# Patient Record
Sex: MALE | Race: WHITE | NOT HISPANIC OR LATINO | Employment: UNEMPLOYED | URBAN - METROPOLITAN AREA
[De-identification: names, ages, dates, MRNs, and addresses within clinical notes are randomized per-mention and may not be internally consistent; named-entity substitution may affect disease eponyms.]

---

## 2017-01-23 ENCOUNTER — OFFICE VISIT (OUTPATIENT)
Dept: INTERNAL MEDICINE | Facility: CLINIC | Age: 22
End: 2017-01-23

## 2017-01-23 VITALS
SYSTOLIC BLOOD PRESSURE: 116 MMHG | BODY MASS INDEX: 24.52 KG/M2 | HEIGHT: 73 IN | DIASTOLIC BLOOD PRESSURE: 84 MMHG | WEIGHT: 185 LBS

## 2017-01-23 DIAGNOSIS — Z00.00 HEALTH CARE MAINTENANCE: Primary | ICD-10-CM

## 2017-01-23 DIAGNOSIS — F80.81 STUTTERING: ICD-10-CM

## 2017-01-23 PROCEDURE — 99395 PREV VISIT EST AGE 18-39: CPT | Performed by: PHYSICIAN ASSISTANT

## 2017-01-23 PROCEDURE — 90686 IIV4 VACC NO PRSV 0.5 ML IM: CPT | Performed by: PHYSICIAN ASSISTANT

## 2017-01-23 NOTE — PROGRESS NOTES
"Chief Complaint   Patient presents with   • Establish Care       Subjective   Newton Franklin is a 21 y.o. male.       History of Present Illness     Pt is here to establish care. He has not had PCP for years, is healthy overall.    The patient is being seen for a health maintenance evaluation.  The last health maintenance was unknown year(s) ago.    Social History  Newton  does not smoke cigarettes.   He drinks no alcohol.  He does not use illicit drugs.    General History  Newton  does have regular dental visits.  He does not complain of vision problems. Last eye exam was 2015.  Immunizations are not up to date. The patient needs the following immunizations: needs influenza    Lifestyle  Newton  consumes a in general, a \"healthy\" diet  .  He exercises daily.    Reproductive Health  Newton  is sexually active. His contraceptive plan is oral contraceptives (estrogen/progesterone).   He does not have erectile dysfunction.     Screening  Last PSA was never.  Last prostate exam was  never.  Last testicular exam was never.  Last colonoscopy was never.    Renato has stuttered for most of his life. Did speech therapy until 3rd grade. Interested in trying some therapy again. Feels like it is affecting his ability to get a job.              No current outpatient prescriptions on file.     [facility]  The following portions of the patient's history were reviewed and updated as appropriate: allergies, current medications, past family history, past medical history, past social history, past surgical history and problem list.    Review of Systems   Constitutional: Negative for appetite change, fever and unexpected weight change.   HENT: Negative for ear pain, facial swelling and sore throat.    Eyes: Negative for pain and visual disturbance.   Respiratory: Negative for chest tightness, shortness of breath and wheezing.    Cardiovascular: Negative for chest pain and palpitations.   Gastrointestinal: Negative for abdominal pain and blood in stool. " "  Endocrine: Negative.    Genitourinary: Negative for difficulty urinating and hematuria.   Musculoskeletal: Negative for joint swelling.   Neurological: Negative for tremors, seizures and syncope.   Hematological: Negative for adenopathy.   Psychiatric/Behavioral: Negative.        Objective   Visit Vitals   • /84   • Ht 73\" (185.4 cm)   • Wt 185 lb (83.9 kg)   • BMI 24.41 kg/m2       Physical Exam   Constitutional: He is oriented to person, place, and time. He appears well-developed and well-nourished. No distress.   HENT:   Head: Normocephalic and atraumatic. Hair is normal.   Right Ear: Hearing, tympanic membrane, external ear and ear canal normal.   Left Ear: Hearing, tympanic membrane, external ear and ear canal normal.   Nose: No sinus tenderness or nasal deformity.   Mouth/Throat: Uvula is midline, oropharynx is clear and moist and mucous membranes are normal. No oral lesions. No uvula swelling.   Eyes: Conjunctivae, EOM and lids are normal. Pupils are equal, round, and reactive to light. Right eye exhibits no discharge. Left eye exhibits no discharge. No scleral icterus. Right eye exhibits normal extraocular motion and no nystagmus. Left eye exhibits normal extraocular motion and no nystagmus.   Fundoscopic exam:       The right eye shows red reflex.        The left eye shows red reflex.   Neck: Normal range of motion. Neck supple. No JVD present. No tracheal deviation present. No thyromegaly present.   Cardiovascular: Normal rate, regular rhythm, normal heart sounds, intact distal pulses and normal pulses.  Exam reveals no gallop.    No murmur heard.  Pulmonary/Chest: Effort normal and breath sounds normal. No respiratory distress. He has no wheezes. He has no rales. He exhibits no tenderness.   Abdominal: Soft. Bowel sounds are normal. He exhibits no distension and no mass. There is no tenderness. There is no guarding. No hernia.   Genitourinary: Testes normal and penis normal.   Musculoskeletal: " Normal range of motion. He exhibits no edema, tenderness or deformity.   Lymphadenopathy:     He has no cervical adenopathy.   Neurological: He is alert and oriented to person, place, and time. He has normal reflexes. He displays normal reflexes. No cranial nerve deficit. He exhibits normal muscle tone. Coordination normal.   Skin: Skin is warm and dry. No rash noted. He is not diaphoretic.   Psychiatric: He has a normal mood and affect. His behavior is normal. Judgment and thought content normal.   Nursing note and vitals reviewed.      No results found for this or any previous visit.     ASSESSMENT/PLAN    Problem List Items Addressed This Visit        Other    Stuttering    Relevant Orders    Ambulatory Referral to Speech Therapy    Health care maintenance - Primary     Immunizations: influenza vaccine given today  Eye exam: due, pt will schedule  Labs: pt will return when fasting for baseline screening labs           Relevant Orders    Lipid Panel    Comprehensive Metabolic Panel    CBC (No Diff)    TSH    Vitamin D 25 Hydroxy               Return in about 1 year (around 1/23/2018) for Annual physical.

## 2017-01-23 NOTE — MR AVS SNAPSHOT
"Newton Franklin   1/23/2017 1:00 PM   Office Visit    Dept Phone:  [phone]   Encounter #:  [id number]    Provider:  GUY Gonzalez   Department:  [facility]                Your Full Care Plan              Your Updated Medication List      Notice  As of 1/23/2017  1:50 PM    You have not been prescribed any medications.            We Performed the Following     CBC (No Diff)     Comprehensive Metabolic Panel     Lipid Panel     TSH     Vitamin D 25 Hydroxy       You Were Diagnosed With        Codes Comments    Health care maintenance    -  Primary ICD-10-CM: Z00.00  ICD-9-CM: V70.0       Instructions     None    Patient Instructions History      Upcoming Appointments     Visit Type Date Time Department    NEW PATIENT 1/23/2017  1:00 PM [facility]      MyChart Signup     Our records indicate that you have declined [facility] sign[facility]. If you would like to sign up for [facility], please email [email or web address] or call [phone] to obtain an activation code.             Other Info from Your Visit           Allergies     No Known Allergies      Reason for Visit     Establish Care           Vital Signs     Blood Pressure Height Weight Body Mass Index Smoking Status       116/84 (20 %/ 71 %)* 73\" (185.4 cm) (89 %, Z= 1.23)† 185 lb (83.9 kg) (86 %, Z= 1.07)† 24.41 kg/m2 (70 %, Z= 0.53)† Never Smoker     *BP percentiles are based on NHBPEP's 4th Report    †Growth percentiles are based on CDC 2-20 Years data.      Problems and Diagnoses Noted     Health maintenance examination    -  Primary        "

## 2017-01-24 NOTE — ASSESSMENT & PLAN NOTE
Immunizations: influenza vaccine given today  Eye exam: due, pt will schedule  Labs: pt will return when fasting for baseline screening labs

## 2017-01-26 ENCOUNTER — TELEPHONE (OUTPATIENT)
Dept: INTERNAL MEDICINE | Facility: CLINIC | Age: 22
End: 2017-01-26

## 2017-01-26 DIAGNOSIS — B80 PINWORMS: Primary | ICD-10-CM

## 2017-01-26 NOTE — TELEPHONE ENCOUNTER
----- Message from Amy Roberson MA sent at 1/26/2017 10:41 AM EST -----  Contact: PATIENT'S GRANDMOTHER      ----- Message -----     From: Nesha Mcdonough     Sent: 1/26/2017  10:35 AM       To: [facility]    RE: PINWORMS    MR BOLIVAR'S GRANDMOTHER CALLED TO SEE IF EMERALD WOULD GIVE HIM A TEST KIT TO CHECK FOR PINWORMS. HE HAS HAD THEM BEFORE AND WOULD LIKE TO KNOW IF HE DOES AGAIN OR NOT AND A FAMILY FRIEND SUGGESTED CALLING HIS PCP TO ASK FOR A TEST KIT. HIS GRANDMOTHER ALSO STATES THAT MR BOLIVAR'S GIRLFRIEND CURRENTLY HAS PINWORMS. IS THIS POSSIBLE OR WOULD HE NEED TO MAKE AN APPT WITH EMERALD?    CALL BACK #[phone]

## 2017-01-26 NOTE — TELEPHONE ENCOUNTER
I contacted the lab at [facility] and they are going to send over some clear paddles that can be used for collecting a sample to be tested for pinworms. I will have the lab call pt when they are available for .

## 2017-01-27 ENCOUNTER — HOSPITAL ENCOUNTER (OUTPATIENT)
Dept: SPEECH THERAPY | Facility: HOSPITAL | Age: 22
Setting detail: THERAPIES SERIES
Discharge: HOME | End: 2017-01-27

## 2017-01-27 DIAGNOSIS — F80.81 STUTTERING: Primary | ICD-10-CM

## 2017-01-27 PROCEDURE — G9163 LANG EXPRESS GOAL STATUS: HCPCS

## 2017-01-27 PROCEDURE — G9162 LANG EXPRESS CURRENT STATUS: HCPCS

## 2017-01-27 PROCEDURE — 92523 SPEECH SOUND LANG COMPREHEN: CPT

## 2017-01-27 NOTE — PROGRESS NOTES
"Outpatient Speech Language Pathology   Adult/Peds Fluency Initial Evaluation  [facility]     Patient Name: Newton Franklin  : 1995  MRN: [id number]  Today's Date: 2017         Visit Date: 2017   Patient Active Problem List   Diagnosis   • Stuttering   • Health care maintenance        No past medical history on file.     Past Surgical History   Procedure Laterality Date   • Tonsillectomy           Visit Dx:    ICD-10-CM ICD-9-CM   1. Stuttering F80.81 315.35                 Fluency Eval - 17 1300     Background/History    Reason for Referral Pt is a 21 year old male with a hx of stuttering since \"birth\", definitely recalls starting to stutter at 6 years old.  -HG    History of Current Complaint Stuttering   -HG    Date of Onset When he was a child.  -HG    Changes Since Date of Onset unspecified  -HG    Previous Evaluations Per pt, he did receive instruction in 2nd grade.   -HG    Effective Fluency Enhancing Strategies slow/easy speech;light articulatory contacts  -HG    Pertinent Family History --   No stuttering as far as pt knows.  -HG    Caregiver Interview    Situations in Which Stuttering is Observed public speaking;telephone conversations;direct questioning;communication with authority figures, communication with same age peers   Pt states that it is un-predictable.  -HG    Types of Dysfluencies part word repetitions;prolongations  -HG    Accompanying Behaviors other   Looking upward.  -HG    Formal Assessment    Formal Assessments and Rating Scales Trigger other1   WPM= 187   an average over three samples.  -HG    Informal Assessment    Observation of Fluency Sample activity  -HG    Type of Activity Observed Relax, calm, monotone, closed mouth.  -HG    Concomitant Behaviors Upwardd looking.  -HG    Observed Phonation Characteristics too soft  -HG    Observed Prosodic Characteristics atypical stress  -    Articulation/Phonology Screening speech is intelligible  -    Language " "Screening language appears age-appropriate  -    Findings    Clinical Impression- Fluency no secondary characteristics are observed  -HG    Impact on Functional Communication- Fluency unable to communicate wants/needs/ideas functionally in ADLs;difficulty communicating in social situations;difficulty communicating via telephone;difficulty with public speaking;difficulty speaking to authority figures;difficulty responding to direct questions  -    Prognosis Mild disfluent communication disorder  -HG    Prognosis Factors Good  -HG      User Key  (r) = Recorded By, (t) = Taken By, (c) = Cosigned By    Initials Name Provider Type     Lakesha Lopez MS [facility]-SLP Speech and Language Pathologist              Adult Speech Language - 01/27/17 1300     Background and History    Reason for Referral Pt is a 21 year old who has hx of stuttering since birth and wishes to get a job and is seeking tx to \"stop stuttering\"  -    Stated Goals \"Stop Stuttering\"  -    Description of Complaint \"There are days I can speak and other days I can't\"  -    Previous Functional Status Speech was non-fluent  -HG    Current Baseline Abilities Pt is able to talk through the disfluencies and pt stated, \"I've learned no strategies, just taught myself over time.\"  -HG    Primary Language in the Home English  -    Informant for the Evaluation Self  -    Comprehension    Recognition WFL: Within Functional Limits  -    Answer Questions WFL: Within Functional Limits  -    Commands WFL: Within Functional Limits  -    Conversation WFL: Within Functional Limits  -    Reading Status WFL: Within Functional Limits  -    Efficiency of Verbal Communication    Verbal Messages are: --   at times.  -HG      User Key  (r) = Recorded By, (t) = Taken By, (c) = Cosigned By    Initials Name Provider Type     Lakesha Lopez MS [facility]-SLP Speech and Language Pathologist                             SLP Education       01/27/17 1300          " Education    Barriers to Learning No barriers identified  -      Education Provided Described results of evaluation;Patient expressed understanding of evaluation;Patient participated in establishing goals and treatment plan;Patient demonstrated recommended strategies;Patient requires further education on strategies, risks  -      Assessed Learning needs;Learning motivation;Learning preferences;Learning readiness  -      Learning Motivation Strong  -      Learning Method Explanation;Demonstration;Teach back  -      Teaching Response Verbalized understanding;Demonstrated understanding;Reinforcement needed  -        User Key  (r) = Recorded By, (t) = Taken By, (c) = Cosigned By    Initials Name Effective Dates     Lakesha Lopez MS [facility]-SLP 06/22/15 -                 SLP OP Goals       01/27/17 1400          Goal Type Needed    Goal Type Needed Other Adult Goals  -HG      Subjective Comments    Subjective Comments Pt alert, cooperative, appeared calm and confident yet passionate about finding a job and moving on with his life.   -HG      Subjective Pain    Able to rate subjective pain? yes  -HG      Other Goals    Other Adult Goal- 1 Pt will use easy onset in order to decrease repetitions at the beginning of words with 80% accuracy.  -HG      Status: Other Adult Goal- 1 New  -HG      Other Adult Goal- 2 Pt will use internal compensatory strategies in order to decrease occurrence of disfluencies in a 5 minute time span with 80% accuracy.  -HG      Status: Other Adult Goal- 2 New  -HG      Other Adult Goal- 3 Pt will slow rate of speech and over articulate at the word and phrase level with 90% accuracy.  -HG      Status: Other Adult Goal- 3 New  -HG      SLP Time Calculation    SLP Goal Re-Cert Due Date 02/26/17  -        User Key  (r) = Recorded By, (t) = Taken By, (c) = Cosigned By    Initials Name Provider Type     Lakesha Lopez MS [facility]-SLP Speech and Language Pathologist                OP SLP  Assessment/Plan - 01/27/17 1300     SLP Assessment    Functional Problems Fluency  -HG    Impact on Functional Communication- Fluency unable to communicate wants/needs/ideas functionally in ADLs;difficulty communicating in social situations;difficulty communicating via telephone;difficulty with public speaking;difficulty speaking to authority figures;difficulty responding to direct questions  -HG    Clinical Impression- Fluency no secondary characteristics are observed  -HG    Functional Problems Comment Due to pt's hx of stuttering and had never received formal tx for correction, pt is unable at this time to become hired for a job.  -HG    Clinical Impression Comments Pt presents with a mild disfluency impeding him from completing a successful job interview and acquiring employment.  -HG    Please refer to paper survey for additional self-reported information Yes  -HG    Please refer to items scanned into chart for additional diagnostic informaiton and handouts as provided by clinician Yes  -HG    SLP Diagnosis Mild disfluency   -HG    Prognosis Excellent (comment)  -HG    Patient/caregiver participated in establishment of treatment plan and goals Yes  -HG    Patient would benefit from skilled therapy intervention Yes  -HG    SLP Plan    Frequency 2-3x/week  -HG    Duration 4 weeks  -HG    Planned CPT's? SLP SPEECH & LANGUAGE EVAL: 93581;SLP INDIVIDUAL SPEECH THERAPY: 97138  -    Expected Duration Therapy Session (min) 45-60 minutes  -HG    Plan Comments Initiate fluency tx.  -HG      User Key  (r) = Recorded By, (t) = Taken By, (c) = Cosigned By    Initials Name Provider Type     Lakesha Lopez, MS CCC-SLP Speech and Language Pathologist                   Time Calculation:   SLP Start Time: 1300    Therapy Charges for Today     Code Description Service Date Service Provider Modifiers Qty    33236327786  ST EVAL SPEECH AND PROD W LANG  7 1/27/2017 Lakesha Lopez MS CCC-SLP GN 1                   Lakesha  NAKIA Lopez, MS CCC-SLP  1/27/2017

## 2017-02-28 ENCOUNTER — HOSPITAL ENCOUNTER (OUTPATIENT)
Dept: SPEECH THERAPY | Facility: HOSPITAL | Age: 22
Setting detail: THERAPIES SERIES
Discharge: HOME | End: 2017-02-28

## 2017-02-28 DIAGNOSIS — F80.81 STUTTERING: Primary | ICD-10-CM

## 2017-02-28 PROCEDURE — G9163 LANG EXPRESS GOAL STATUS: HCPCS

## 2017-02-28 PROCEDURE — G9162 LANG EXPRESS CURRENT STATUS: HCPCS

## 2017-02-28 PROCEDURE — 92507 TX SP LANG VOICE COMM INDIV: CPT

## 2017-03-07 ENCOUNTER — HOSPITAL ENCOUNTER (OUTPATIENT)
Dept: SPEECH THERAPY | Facility: HOSPITAL | Age: 22
Setting detail: THERAPIES SERIES
Discharge: HOME | End: 2017-03-07

## 2017-03-07 DIAGNOSIS — F80.81 STUTTERING: Primary | ICD-10-CM

## 2017-03-07 PROCEDURE — 92507 TX SP LANG VOICE COMM INDIV: CPT

## 2017-03-07 NOTE — PROGRESS NOTES
Outpatient Speech Language Pathology   Adult Speech Language Cognitive Treatment Note  [facility]     Patient Name: Newton Franklin  : 1995  MRN: [id number]  Today's Date: 3/7/2017         Visit Date: 2017   Patient Active Problem List   Diagnosis   • Stuttering   • Health care maintenance          Visit Dx:    ICD-10-CM ICD-9-CM   1. Stuttering F80.81 315.35                               SLP OP Goals       17 0800       Subjective Comments    Subjective Comments Pt alert, cooperative.  Pt did state that he has tried strategies and over articulating is the best strategy that has worked so far.   -HG     Other Goals    Other Adult Goal- 1 Pt will use easy onset in order to decrease repetitions at the beginning of words with 80% accuracy.  -HG     Status: Other Adult Goal- 1 Progressing as expected  -HG     Comments: Other Adult Goal- 1 Pt used strategy this date with 60% accuracy.  Pt completed a cold call to a local restaurant in order to find out a special and pt experienced a block with two attempts without strategy and with using a breathing technique, pt was able to complete his thought. 3/7/17: In conversation,pt struggled with I at the beginning of the sentence. And cold calls resulted in frequent repetitions and blocks where easy onset and over articulation was used and pt was 70-80% accurate in using them.    -HG     Other Adult Goal- 2 Pt will use internal compensatory strategies in order to decrease occurrence of disfluencies in a 5 minute time span with 80% accuracy.  -HG     Status: Other Adult Goal- 2 Progressing as expected  -HG     Comments: Other Adult Goal- 2 Pt is able to speak in prolongations up to 10 minutes without a disfluency.  3/7/17: in conversation, pt is 90% fluent with occasional repetitions.   -HG     Other Adult Goal- 3 Pt will slow rate of speech and over articulate at the word and phrase level with 90% accuracy.  -HG     Status: Other Adult Goal- 3 Progressing as  expected  -     Comments: Other Adult Goal- 3 Pt asked to repeat multisyllabic words and pt was 90% fluent. 3/7/17: Pt reports that over-articulating is the best strategy thus far for decreasing disfluencies.   -     SLP Time Calculation    SLP Goal Re-Cert Due Date 03/30/17  -       User Key  (r) = Recorded By, (t) = Taken By, (c) = Cosigned By    Initials Name Provider Type     Lakesha Lopez MS [facility]-SLP Speech and Language Pathologist                OP SLP Education       03/07/17 0800    Education    Barriers to Learning No barriers identified  -    Education Provided Patient demonstrated recommended strategies;Patient requires further education on strategies, risks  -    Assessed Learning needs;Learning motivation;Learning preferences;Learning readiness  -    Learning Motivation Strong  -    Learning Method Explanation;Demonstration;Teach back;Written materials  -    Teaching Response Verbalized understanding;Demonstrated understanding;Reinforcement needed  -    Education Comments Pt given homework in order to complete and SLP to call and followup.  -      User Key  (r) = Recorded By, (t) = Taken By, (c) = Cosigned By    Initials Name Effective Dates     Lakesha Lopez MS CCC-SLP 06/22/15 -                 OP SLP Assessment/Plan - 03/07/17 0800     SLP Assessment    Functional Problems Comment Pt continues to exhibit disfluencies at the sentence and conversation level.   -    SLP Plan    Plan Comments Cont with fluency tx.  -      User Key  (r) = Recorded By, (t) = Taken By, (c) = Cosigned By    Initials Name Provider Type     Lakesha Lopez MS [facility]-SLP Speech and Language Pathologist                 Time Calculation:   SLP Start Time: 0800    Therapy Charges for Today     Code Description Service Date Service Provider Modifiers Qty    78001507038  ST TREATMENT SPEECH 4 3/7/2017 Lakesha Lopez MS CCC-SLP GN 1                   Lakesha Lopez MS CCC-SLP  3/7/2017

## 2017-03-14 ENCOUNTER — HOSPITAL ENCOUNTER (OUTPATIENT)
Dept: SPEECH THERAPY | Facility: HOSPITAL | Age: 22
Setting detail: THERAPIES SERIES
Discharge: HOME | End: 2017-03-14

## 2017-03-14 DIAGNOSIS — F80.81 STUTTERING: Primary | ICD-10-CM

## 2017-03-14 PROCEDURE — 92507 TX SP LANG VOICE COMM INDIV: CPT

## 2017-03-14 NOTE — PROGRESS NOTES
Outpatient Speech Language Pathology   Adult Speech Language Cognitive Treatment Note  [facility]     Patient Name: Newton Franklin  : 1995  MRN: [id number]  Today's Date: 3/14/2017         Visit Date: 2017   Patient Active Problem List   Diagnosis   • Stuttering   • Health care maintenance          Visit Dx:    ICD-10-CM ICD-9-CM   1. Stuttering F80.81 315.35                               SLP OP Goals       17 0800       Subjective Comments    Subjective Comments Pt alert, cooperative, using strategies at home to decrease disfluencies.  -HG     Other Goals    Other Adult Goal- 1 Pt will use easy onset in order to decrease repetitions at the beginning of words with 80% accuracy.  -HG     Status: Other Adult Goal- 1 Progressing as expected  -HG     Comments: Other Adult Goal- 1 3/14/17: Pt required min-mod cues in order to use easy onset for reading, answering questions and in conversation, 70% accurate. Pt used strategy this date with 60% accuracy.  Pt completed a cold call to a local restaurant in order to find out a special and pt experienced a block with two attempts without strategy and with using a breathing technique, pt was able to complete his thought. 3/7/17: In conversation,pt struggled with I at the beginning of the sentence. And cold calls resulted in frequent repetitions and blocks where easy onset and over articulation was used and pt was 70-80% accurate in using them.    -HG     Other Adult Goal- 2 Pt will use internal compensatory strategies in order to decrease occurrence of disfluencies in a 5 minute time span with 80% accuracy.  -HG     Status: Other Adult Goal- 2 Progressing as expected  -HG     Comments: Other Adult Goal- 2 3/14/17: Pt using comp. straregies to over articulate, slow rate of speech and use easy onset to improve speech. Pt is able to speak in prolongations up to 10 minutes without a disfluency.  3/7/17: in conversation, pt is 90% fluent with occasional  repetitions.   -HG     Other Adult Goal- 3 Pt will slow rate of speech and over articulate at the word and phrase level with 90% accuracy.  -HG     Status: Other Adult Goal- 3 Progressing as expected  -HG     Comments: Other Adult Goal- 3 3/14/17; Pt using slow rate of speech and over articulation with 70% accuracy.  Pt asked to repeat multisyllabic words and pt was 90% fluent. 3/7/17: Pt reports that over-articulating is the best strategy thus far for decreasing disfluencies.   -     SLP Time Calculation    SLP Goal Re-Cert Due Date 03/30/17  -       User Key  (r) = Recorded By, (t) = Taken By, (c) = Cosigned By    Initials Name Provider Type    DEVONTE Lakesha Lopez MS CCC-SLP Speech and Language Pathologist                OP SLP Education       03/14/17 0800    Education    Education Comments Pt given phone homework and asked to call  in order to report his findings and practice strategies on the phone.   -      User Key  (r) = Recorded By, (t) = Taken By, (c) = Cosigned By    Initials Name Effective Dates     Lakesha Lopez MS CCC-SLP 06/22/15 -                 OP SLP Assessment/Plan - 03/14/17 0800     SLP Assessment    Functional Problems Fluency  -HG    Functional Problems Comment Pt continues to exhibit disfluencies but is showing improvemetn with use of strategies.   -    SLP Plan    Plan Comments Cont with fluency tx.  -      User Key  (r) = Recorded By, (t) = Taken By, (c) = Cosigned By    Initials Name Provider Type    DEVONTE Lopez MS CCC-SLP Speech and Language Pathologist                 Time Calculation:   SLP Start Time: 0800    Therapy Charges for Today     Code Description Service Date Service Provider Modifiers Qty    [id number] [facility] TREATMENT SPEECH 4 3/14/2017 Lakesha Lopez MS CCC-SLP GN 1                   Lakesha Lopez MS CCC-MICHAEL  3/14/2017

## 2017-03-28 ENCOUNTER — HOSPITAL ENCOUNTER (OUTPATIENT)
Dept: SPEECH THERAPY | Facility: HOSPITAL | Age: 22
Setting detail: THERAPIES SERIES
Discharge: HOME | End: 2017-03-28

## 2017-03-28 DIAGNOSIS — F80.81 STUTTERING: Primary | ICD-10-CM

## 2017-03-28 PROCEDURE — 92507 TX SP LANG VOICE COMM INDIV: CPT

## 2017-03-28 NOTE — PROGRESS NOTES
Outpatient Speech Language Pathology   Adult Speech Language Cognitive Treatment Note  [facility]     Patient Name: Newton Franklin  : 1995  MRN: [id number]  Today's Date: 3/28/2017         Visit Date: 2017   Patient Active Problem List   Diagnosis   • Stuttering   • Health care maintenance          Visit Dx:    ICD-10-CM ICD-9-CM   1. Stuttering F80.81 315.35                               SLP OP Goals       17 1300       Subjective Comments    Subjective Comments Pt alert, cooperative, using strategies and 80% of the time strategies correct the stutter.  -HG     Other Goals    Other Adult Goal- 1 Pt will use easy onset in order to decrease repetitions at the beginning of words with 80% accuracy.  -HG     Status: Other Adult Goal- 1 Progressing as expected  -HG     Comments: Other Adult Goal- 1 3/28/17: During a cold call activity, pt exhibited initial consonant repetitions and with use of a pause, pt was able to recover after a series of several repetitions.  3/14/17: Pt required min-mod cues in order to use easy onset for reading, answering questions and in conversation, 70% accurate. Pt used strategy this date with 60% accuracy.  Pt completed a cold call to a local restaurant in order to find out a special and pt experienced a block with two attempts without strategy and with using a breathing technique, pt was able to complete his thought. 3/7/17: In conversation,pt struggled with I at the beginning of the sentence. And cold calls resulted in frequent repetitions and blocks where easy onset and over articulation was used and pt was 70-80% accurate in using them.    -HG     Other Adult Goal- 2 Pt will use internal compensatory strategies in order to decrease occurrence of disfluencies in a 5 minute time span with 80% accuracy.  -HG     Status: Other Adult Goal- 2 Progressing as expected  -HG     Comments: Other Adult Goal- 2 3/28/17: Pt using a breathe and pause to stop disfluencies and it is  helping per pt. 3/14/17: Pt using comp. straregies to over articulate, slow rate of speech and use easy onset to improve speech. Pt is able to speak in prolongations up to 10 minutes without a disfluency.  3/7/17: in conversation, pt is 90% fluent with occasional repetitions.   -HG     Other Adult Goal- 3 Pt will slow rate of speech and over articulate at the word and phrase level with 90% accuracy.  -HG     Status: Other Adult Goal- 3 Progressing as expected  -HG     Comments: Other Adult Goal- 3 3/28/17: Pt uses slow rate and over articulation 30-50% of the time. 3/14/17; Pt using slow rate of speech and over articulation with 70% accuracy.  Pt asked to repeat multisyllabic words and pt was 90% fluent. 3/7/17: Pt reports that over-articulating is the best strategy thus far for decreasing disfluencies.   -     SLP Time Calculation    SLP Goal Re-Cert Due Date 03/30/17  -HG       User Key  (r) = Recorded By, (t) = Taken By, (c) = Cosigned By    Initials Name Provider Type    DEVONTE JOYCE MS Jessica [facility]-SLP Speech and Language Pathologist                OP SLP Education       03/28/17 1300    Education    Education Comments Pt prepared for calls on his cell phone in order to practice strategies.   -      User Key  (r) = Recorded By, (t) = Taken By, (c) = Cosigned By    Initials Name Effective Dates    DEVONTE Lakesha L JessicaMS GEORGE-SLP 06/22/15 -                 OP SLP Assessment/Plan - 03/28/17 1300     SLP Assessment    Functional Problems Comment Pt is inconsistent in his disfluencies and will present with repetitions at the conversation level.  Over the phone, pt experiences blocks and with use of strategies,  pt was error free 50% of the time.   -    SLP Plan    Plan Comments Cont with fluency tx.   -      User Key  (r) = Recorded By, (t) = Taken By, (c) = Cosigned By    Initials Name Provider Type    DEVONTE Lopez, MS [facility]-SLP Speech and Language Pathologist                 Time Calculation:   SLP  Start Time: 1300    Therapy Charges for Today     Code Description Service Date Service Provider Modifiers Qty    [id number]  ST TREATMENT SPEECH 4 3/28/2017 Lakesha Lopez, MS CCC-SLP GN 1                   Lakesha Lopez MS DORIS-SLP  3/28/2017

## 2017-06-20 ENCOUNTER — DOCUMENTATION (OUTPATIENT)
Dept: SPEECH THERAPY | Facility: HOSPITAL | Age: 22
End: 2017-06-20

## 2017-06-20 DIAGNOSIS — F80.81 STUTTERING: Primary | ICD-10-CM

## 2017-06-20 NOTE — THERAPY DISCHARGE NOTE
Speech Language Pathology Discharge Summary         Patient Name: Newton Franklin  : 1995  MRN: [id number]    Today's Date: 2017            SLP OP Goals       17 1450       Other Goals    Other Adult Goal- 1 Pt will use easy onset in order to decrease repetitions at the beginning of words with 80% accuracy.  -HG     Status: Other Adult Goal- 1 Progressing as expected  -HG     Comments: Other Adult Goal- 1 3/28/17: During a cold call activity, pt exhibited initial consonant repetitions and with use of a pause, pt was able to recover after a series of several repetitions.  3/14/17: Pt required min-mod cues in order to use easy onset for reading, answering questions and in conversation, 70% accurate. Pt used strategy this date with 60% accuracy.  Pt completed a cold call to a local restaurant in order to find out a special and pt experienced a block with two attempts without strategy and with using a breathing technique, pt was able to complete his thought. 3/7/17: In conversation,pt struggled with I at the beginning of the sentence. And cold calls resulted in frequent repetitions and blocks where easy onset and over articulation was used and pt was 70-80% accurate in using them.    -HG     Other Adult Goal- 2 Pt will use internal compensatory strategies in order to decrease occurrence of disfluencies in a 5 minute time span with 80% accuracy.  -HG     Status: Other Adult Goal- 2 Progressing as expected  -HG     Comments: Other Adult Goal- 2 3/28/17: Pt using a breathe and pause to stop disfluencies and it is helping per pt. 3/14/17: Pt using comp. straregies to over articulate, slow rate of speech and use easy onset to improve speech. Pt is able to speak in prolongations up to 10 minutes without a disfluency.  3/7/17: in conversation, pt is 90% fluent with occasional repetitions.   -HG     Other Adult Goal- 3 Pt will slow rate of speech and over articulate at the word and phrase level with 90% accuracy.   -HG     Status: Other Adult Goal- 3 Progressing as expected  -HG     Comments: Other Adult Goal- 3 3/28/17: Pt uses slow rate and over articulation 30-50% of the time. 3/14/17; Pt using slow rate of speech and over articulation with 70% accuracy.  Pt asked to repeat multisyllabic words and pt was 90% fluent. 3/7/17: Pt reports that over-articulating is the best strategy thus far for decreasing disfluencies.   -HG       User Key  (r) = Recorded By, (t) = Taken By, (c) = Cosigned By    Initials Name Provider Type    HG Lakesha Lopez MS CCC-SLP Speech and Language Pathologist                 Time Calculation:                    Lakesha Lopez MS CCC-SLP  6/20/2017

## 2017-09-08 ENCOUNTER — OFFICE VISIT (OUTPATIENT)
Dept: INTERNAL MEDICINE | Facility: CLINIC | Age: 22
End: 2017-09-08

## 2017-09-08 VITALS
SYSTOLIC BLOOD PRESSURE: 126 MMHG | HEIGHT: 74 IN | BODY MASS INDEX: 21.9 KG/M2 | DIASTOLIC BLOOD PRESSURE: 82 MMHG | WEIGHT: 170.64 LBS

## 2017-09-08 DIAGNOSIS — B35.1 TOENAIL FUNGUS: Primary | ICD-10-CM

## 2017-09-08 LAB
ALBUMIN SERPL-MCNC: 4.2 G/DL (ref 3.2–4.8)
ALBUMIN/GLOB SERPL: 1.5 G/DL (ref 1.5–2.5)
ALP SERPL-CCNC: 76 U/L (ref 25–100)
ALT SERPL-CCNC: 10 U/L (ref 7–40)
AST SERPL-CCNC: 13 U/L (ref 0–33)
BILIRUB SERPL-MCNC: 0.5 MG/DL (ref 0.3–1.2)
BUN SERPL-MCNC: 18 MG/DL (ref 9–23)
BUN/CREAT SERPL: 16.4 (ref 7–25)
CALCIUM SERPL-MCNC: 9.4 MG/DL (ref 8.7–10.4)
CHLORIDE SERPL-SCNC: 104 MMOL/L (ref 99–109)
CO2 SERPL-SCNC: 26 MMOL/L (ref 20–31)
CREAT SERPL-MCNC: 1.1 MG/DL (ref 0.6–1.3)
GLOBULIN SER CALC-MCNC: 2.8 GM/DL
GLUCOSE SERPL-MCNC: 74 MG/DL (ref 70–100)
POTASSIUM SERPL-SCNC: 3.5 MMOL/L (ref 3.5–5.5)
PROT SERPL-MCNC: 7 G/DL (ref 5.7–8.2)
SODIUM SERPL-SCNC: 140 MMOL/L (ref 132–146)

## 2017-09-08 PROCEDURE — 99213 OFFICE O/P EST LOW 20 MIN: CPT | Performed by: PHYSICIAN ASSISTANT

## 2017-09-08 RX ORDER — TERBINAFINE HYDROCHLORIDE 250 MG/1
250 TABLET ORAL DAILY
Qty: 30 TABLET | Refills: 2 | Status: SHIPPED | OUTPATIENT
Start: 2017-09-08 | End: 2018-05-02

## 2017-09-08 NOTE — PROGRESS NOTES
"Chief Complaint   Patient presents with   • Follow-up     Toenail Fingus Both Big Toes x 2 weeks        Subjective   Newton Franklin is a 21 y.o. male.       History of Present Illness     Pt noticed a couple weeks ago that the tips of his bilateral great toes are darkened and peeling, noticed about 2 weeks ago. Has never had anything like this before. Tried trimming it off and the discoloration has returned. Does run and climb so his feet sweat frequently. Has some recurrent peeling of his feet, no itching.      Current Outpatient Prescriptions:   •  terbinafine (lamiSIL) 250 MG tablet, Take 1 tablet by mouth Daily., Disp: 30 tablet, Rfl: 2     PMFSH  The following portions of the patient's history were reviewed and updated as appropriate: allergies, current medications, past family history, past medical history, past social history, past surgical history and problem list.    Review of Systems   Constitutional: Negative for activity change, appetite change, diaphoresis and fatigue.   HENT: Negative.    Respiratory: Negative for chest tightness, shortness of breath and wheezing.    Cardiovascular: Negative for chest pain and palpitations.   Genitourinary: Negative.    Skin: Negative for color change, pallor, rash and wound.   Neurological: Negative for dizziness, weakness and light-headedness.   Psychiatric/Behavioral: Negative.        Objective   /82  Ht 74\" (188 cm)  Wt 170 lb 10.2 oz (77.4 kg)  BMI 21.91 kg/m2    Physical Exam   Constitutional: He is oriented to person, place, and time. He appears well-developed and well-nourished.   HENT:   Head: Normocephalic and atraumatic.   Right Ear: External ear normal.   Left Ear: External ear normal.   Eyes: Conjunctivae are normal.   Neck: Normal range of motion.   Pulmonary/Chest: Effort normal.   Musculoskeletal: Normal range of motion.   Neurological: He is alert and oriented to person, place, and time.   Skin: Skin is warm and dry.   Bilateral great toe nails- " brownish, yellow tips, slightly thickened   Psychiatric: He has a normal mood and affect. His behavior is normal. Judgment and thought content normal.       No results found for this or any previous visit.     ASSESSMENT/PLAN    Problem List Items Addressed This Visit     None      Visit Diagnoses     Toenail fungus    -  Primary    Check cmp, start lamisil and can also apply ina's vaporub BID. F/u in 3 months.    Relevant Medications    terbinafine (lamiSIL) 250 MG tablet    Other Relevant Orders    Comprehensive Metabolic Panel               Return in about 3 months (around 12/8/2017) for Recheck.

## 2017-12-14 ENCOUNTER — OFFICE VISIT (OUTPATIENT)
Dept: INTERNAL MEDICINE | Facility: CLINIC | Age: 22
End: 2017-12-14

## 2017-12-14 VITALS
HEART RATE: 62 BPM | DIASTOLIC BLOOD PRESSURE: 68 MMHG | OXYGEN SATURATION: 99 % | WEIGHT: 171 LBS | SYSTOLIC BLOOD PRESSURE: 116 MMHG | BODY MASS INDEX: 21.96 KG/M2

## 2017-12-14 DIAGNOSIS — K52.9 GASTROENTERITIS: ICD-10-CM

## 2017-12-14 DIAGNOSIS — Z00.00 HEALTH CARE MAINTENANCE: ICD-10-CM

## 2017-12-14 DIAGNOSIS — B35.1 ONYCHOMYCOSIS: Primary | ICD-10-CM

## 2017-12-14 PROCEDURE — 90686 IIV4 VACC NO PRSV 0.5 ML IM: CPT | Performed by: PHYSICIAN ASSISTANT

## 2017-12-14 PROCEDURE — 99213 OFFICE O/P EST LOW 20 MIN: CPT | Performed by: PHYSICIAN ASSISTANT

## 2017-12-14 PROCEDURE — 90471 IMMUNIZATION ADMIN: CPT | Performed by: PHYSICIAN ASSISTANT

## 2017-12-14 NOTE — PROGRESS NOTES
Chief Complaint   Patient presents with   • Follow-up       Subjective   Newtondwight Franklin is a 22 y.o. male.       History of Present Illness     Pt took a few days of the medication and the toenail fungus resolved within a few days.     Last week he was sick with vomiting for about a day, decreased appetite the following 3 days. Now back to illness. Not sure if it was food poisoning or stomach bug. No other sick contacts.        Current Outpatient Prescriptions:   •  terbinafine (lamiSIL) 250 MG tablet, Take 1 tablet by mouth Daily., Disp: 30 tablet, Rfl: 2     PMFSH  The following portions of the patient's history were reviewed and updated as appropriate: allergies, current medications, past family history, past medical history, past social history, past surgical history and problem list.    Review of Systems   Constitutional: Negative for activity change, appetite change, diaphoresis, fatigue, fever and unexpected weight change.   HENT: Negative.    Respiratory: Negative for chest tightness, shortness of breath and wheezing.    Cardiovascular: Negative for chest pain.   Gastrointestinal: Negative for abdominal pain, diarrhea, nausea and vomiting.   Genitourinary: Negative.    Musculoskeletal: Negative for arthralgias.   Skin: Negative.    Neurological: Negative for dizziness, syncope, weakness and light-headedness.       Objective   /68  Pulse 62  Wt 77.6 kg (171 lb)  SpO2 99%  BMI 21.96 kg/m2    Physical Exam   Constitutional: He appears well-developed and well-nourished.   HENT:   Head: Normocephalic.   Right Ear: Hearing, tympanic membrane, external ear and ear canal normal.   Left Ear: Hearing, tympanic membrane, external ear and ear canal normal.   Nose: Nose normal.   Mouth/Throat: Oropharynx is clear and moist.   Eyes: Conjunctivae are normal. Pupils are equal, round, and reactive to light.   Neck: Normal range of motion.   Cardiovascular: Normal rate, regular rhythm and normal heart sounds.     Pulmonary/Chest: Effort normal and breath sounds normal. He has no decreased breath sounds. He has no wheezes. He has no rhonchi. He has no rales.   Musculoskeletal: Normal range of motion.   Neurological: He is alert.   Skin: Skin is warm and dry.   Psychiatric: He has a normal mood and affect. His behavior is normal.   Nursing note and vitals reviewed.      Results for orders placed or performed in visit on 09/08/17   Comprehensive Metabolic Panel   Result Value Ref Range    Glucose 74 70 - 100 mg/dL    BUN 18 9 - 23 mg/dL    Creatinine 1.10 0.60 - 1.30 mg/dL    eGFR Non African Am 86 >60 mL/min/1.73    eGFR African Am 105 >60 mL/min/1.73    BUN/Creatinine Ratio 16.4 7.0 - 25.0    Sodium 140 132 - 146 mmol/L    Potassium 3.5 3.5 - 5.5 mmol/L    Chloride 104 99 - 109 mmol/L    Total CO2 26.0 20.0 - 31.0 mmol/L    Calcium 9.4 8.7 - 10.4 mg/dL    Total Protein 7.0 5.7 - 8.2 g/dL    Albumin 4.20 3.20 - 4.80 g/dL    Globulin 2.8 gm/dL    A/G Ratio 1.5 1.5 - 2.5 g/dL    Total Bilirubin 0.5 0.3 - 1.2 mg/dL    Alkaline Phosphatase 76 25 - 100 U/L    AST (SGOT) 13 0 - 33 U/L    ALT (SGPT) 10 7 - 40 U/L        ASSESSMENT/PLAN    Problem List Items Addressed This Visit        Other    Health care maintenance     Influenza vaccine given today.           Other Visit Diagnoses     Onychomycosis    -  Primary    REsolved with keeping feet clean and dry.    Gastroenteritis        Resolved.               Return in about 6 months (around 6/14/2018) for Annual physical.

## 2018-05-02 ENCOUNTER — OFFICE VISIT (OUTPATIENT)
Dept: INTERNAL MEDICINE | Facility: CLINIC | Age: 23
End: 2018-05-02

## 2018-05-02 VITALS
OXYGEN SATURATION: 99 % | HEIGHT: 74 IN | DIASTOLIC BLOOD PRESSURE: 72 MMHG | BODY MASS INDEX: 21.56 KG/M2 | SYSTOLIC BLOOD PRESSURE: 108 MMHG | HEART RATE: 77 BPM | WEIGHT: 168 LBS

## 2018-05-02 DIAGNOSIS — J01.00 ACUTE MAXILLARY SINUSITIS, RECURRENCE NOT SPECIFIED: ICD-10-CM

## 2018-05-02 DIAGNOSIS — H66.002 ACUTE SUPPURATIVE OTITIS MEDIA OF LEFT EAR WITHOUT SPONTANEOUS RUPTURE OF TYMPANIC MEMBRANE, RECURRENCE NOT SPECIFIED: Primary | ICD-10-CM

## 2018-05-02 PROCEDURE — 99213 OFFICE O/P EST LOW 20 MIN: CPT | Performed by: NURSE PRACTITIONER

## 2018-05-02 RX ORDER — AZITHROMYCIN 250 MG/1
TABLET, FILM COATED ORAL
Qty: 6 TABLET | Refills: 0 | Status: SHIPPED | OUTPATIENT
Start: 2018-05-02 | End: 2018-05-07

## 2018-05-02 RX ORDER — FLUTICASONE PROPIONATE 50 MCG
2 SPRAY, SUSPENSION (ML) NASAL DAILY
Qty: 1 BOTTLE | Refills: 2 | Status: SHIPPED | OUTPATIENT
Start: 2018-05-02 | End: 2019-11-26

## 2018-05-02 RX ORDER — LORATADINE 10 MG/1
10 TABLET ORAL DAILY
Qty: 30 TABLET | Refills: 1 | Status: SHIPPED | OUTPATIENT
Start: 2018-05-02 | End: 2019-11-26

## 2018-05-02 NOTE — PROGRESS NOTES
"CHIEF COMPLAINT  Chief Complaint   Patient presents with   • Cough     pt states that yesterday he coughed so hard he vomitted. pt states this has been going for about 3 days.    • URI       HPI  Newton Franklin is a 22 y.o. male  presents with complaint of 3 day hx of chest congestion, one episode of vomiting after coughing really hard--stomach felt queazy at the time; occ sputum clear prod with cough with occ wheezing; woke up severe h/a, took advil which did relieve pain; nasal congestion with large amt of clear mucous, mild sore throat after coughing started; mild body aches, but also climbed this weekend a little harder than usual. Unsure of fever      History reviewed. No pertinent past medical history.    Family History   Problem Relation Age of Onset   • Migraines Mother    • Heart attack Maternal Grandfather    • Osteoporosis Paternal Grandmother        Social History     Social History   • Marital status: Unknown     Spouse name: N/A   • Number of children: N/A   • Years of education: N/A     Occupational History   • Not on file.     Social History Main Topics   • Smoking status: Never Smoker   • Smokeless tobacco: Never Used   • Alcohol use Not on file   • Drug use: Unknown   • Sexual activity: Not on file     Other Topics Concern   • Not on file     Social History Narrative   • No narrative on file       ROS  Review of Systems   Constitutional: Positive for fatigue. Negative for activity change, appetite change and fever.   HENT: Positive for congestion, postnasal drip, rhinorrhea, sinus pressure and sore throat. Negative for ear pain and sinus pain.    Respiratory: Positive for cough, chest tightness and wheezing. Negative for shortness of breath.    Gastrointestinal: Positive for nausea.   Neurological: Positive for headaches. Negative for dizziness.   All other systems reviewed and are negative.      /72   Pulse 77   Ht 188 cm (74\")   Wt 76.2 kg (168 lb)   SpO2 99%   BMI 21.57 kg/m²     PHYSICAL " EXAM  Physical Exam   Constitutional: He is oriented to person, place, and time. He appears well-developed and well-nourished.   HENT:   Head: Normocephalic and atraumatic.   HENT--left TM is bulging with lg yellow purulent effusion, severe erythema; bilat nasal passages are red and swollen, yellowish mucus; bilat maxillary sinus tenderness; throat has severe erythema; lg amt of thick, clear PND   Eyes: Conjunctivae are normal.   Neck: Trachea normal and normal range of motion. Neck supple. No JVD present. No thyromegaly present.   Cardiovascular: Normal rate, regular rhythm and normal heart sounds.    No murmur heard.  No swelling in BLE   Pulmonary/Chest: Effort normal and breath sounds normal.   Lymphadenopathy:   Mild bilat ant cervical w/o tenderness     Neurological: He is alert and oriented to person, place, and time.   Skin: Skin is warm, dry and intact.   Vitals reviewed.        ASSESSMENT/PLAN  1. Acute suppurative otitis media of left ear without spontaneous rupture of tympanic membrane, recurrence not specified  -start today  - fluticasone ([employer] FLUTICASONE PROPIONATE) 50 MCG/ACT nasal spray; 2 sprays into each nostril Daily.  Dispense: 1 bottle; Refill: 2  - azithromycin (ZITHROMAX) 250 MG tablet; Take 2 tablets the first day, then 1 tablet daily for 4 days.  Dispense: 6 tablet; Refill: 0    2. Acute maxillary sinusitis, recurrence not specified  - loratadine (CLARITIN) 10 MG tablet; Take 1 tablet by mouth Daily.  Dispense: 30 tablet; Refill: 1  - azithromycin (ZITHROMAX) 250 MG tablet; Take 2 tablets the first day, then 1 tablet daily for 4 days.  Dispense: 6 tablet; Refill: 0    May continue loratadine 10 mg daily through hiking trip coming up in 3 weeks, if needs refill to take with him, ok to fill    Comfort Measures--  1. Increase water intake; decaffeinated beverages are best  2. May take mucinex or OTC cold medication if it helps relieve symptoms  3. Tylenol/ibuprofen for fever/pain (make sure if  taking cold medication you are not doubling up your dose of either of these)  4. May take sudafed for congestion; DO NOT take if you have high blood pressure.  5. Flonase nasal spray, 1 spray to each side of nose twice a day        FOLLOW-UP  Next schedule f/u with Oma Allison, PAC    RTC sooner as needed.    Patient verbalizes understanding of medication dosage, comfort measures, instructions for treatment and follow-up.    Bea Sanchez, DEVON  05/02/2018

## 2018-06-14 ENCOUNTER — OFFICE VISIT (OUTPATIENT)
Dept: INTERNAL MEDICINE | Facility: CLINIC | Age: 23
End: 2018-06-14

## 2018-06-14 VITALS
WEIGHT: 170.6 LBS | DIASTOLIC BLOOD PRESSURE: 74 MMHG | SYSTOLIC BLOOD PRESSURE: 112 MMHG | BODY MASS INDEX: 21.9 KG/M2 | HEART RATE: 78 BPM | OXYGEN SATURATION: 98 %

## 2018-06-14 DIAGNOSIS — R55 VASOVAGAL SYNCOPE: ICD-10-CM

## 2018-06-14 DIAGNOSIS — S56.912A STRAIN OF LEFT FOREARM, INITIAL ENCOUNTER: ICD-10-CM

## 2018-06-14 DIAGNOSIS — Z00.00 HEALTH CARE MAINTENANCE: Primary | ICD-10-CM

## 2018-06-14 LAB
25(OH)D3+25(OH)D2 SERPL-MCNC: 23.5 NG/ML
ALBUMIN SERPL-MCNC: 4.41 G/DL (ref 3.2–4.8)
ALBUMIN/GLOB SERPL: 1.6 G/DL (ref 1.5–2.5)
ALP SERPL-CCNC: 88 U/L (ref 25–100)
ALT SERPL-CCNC: 13 U/L (ref 7–40)
AST SERPL-CCNC: 18 U/L (ref 0–33)
BASOPHILS # BLD AUTO: 0.04 10*3/MM3 (ref 0–0.2)
BASOPHILS NFR BLD AUTO: 1.2 % (ref 0–1)
BILIRUB SERPL-MCNC: 0.7 MG/DL (ref 0.3–1.2)
BUN SERPL-MCNC: 15 MG/DL (ref 9–23)
BUN/CREAT SERPL: 17.4 (ref 7–25)
CALCIUM SERPL-MCNC: 9.2 MG/DL (ref 8.7–10.4)
CHLORIDE SERPL-SCNC: 106 MMOL/L (ref 99–109)
CHOLEST SERPL-MCNC: 104 MG/DL (ref 0–200)
CO2 SERPL-SCNC: 26 MMOL/L (ref 20–31)
CREAT SERPL-MCNC: 0.86 MG/DL (ref 0.6–1.3)
EOSINOPHIL # BLD AUTO: 0.21 10*3/MM3 (ref 0–0.3)
EOSINOPHIL NFR BLD AUTO: 6.2 % (ref 0–3)
ERYTHROCYTE [DISTWIDTH] IN BLOOD BY AUTOMATED COUNT: 12.8 % (ref 11.3–14.5)
GFR SERPLBLD CREATININE-BSD FMLA CKD-EPI: 113 ML/MIN/1.73
GFR SERPLBLD CREATININE-BSD FMLA CKD-EPI: 137 ML/MIN/1.73
GLOBULIN SER CALC-MCNC: 2.7 GM/DL
GLUCOSE SERPL-MCNC: 90 MG/DL (ref 70–100)
HCT VFR BLD AUTO: 43.8 % (ref 38.9–50.9)
HDLC SERPL-MCNC: 35 MG/DL (ref 40–60)
HGB BLD-MCNC: 15.3 G/DL (ref 13.1–17.5)
IMM GRANULOCYTES # BLD: 0 10*3/MM3 (ref 0–0.03)
IMM GRANULOCYTES NFR BLD: 0 % (ref 0–0.6)
LDLC SERPL CALC-MCNC: 61 MG/DL (ref 0–100)
LYMPHOCYTES # BLD AUTO: 1.19 10*3/MM3 (ref 0.6–4.8)
LYMPHOCYTES NFR BLD AUTO: 35.3 % (ref 24–44)
MCH RBC QN AUTO: 30.8 PG (ref 27–31)
MCHC RBC AUTO-ENTMCNC: 34.9 G/DL (ref 32–36)
MCV RBC AUTO: 88.1 FL (ref 80–99)
MONOCYTES # BLD AUTO: 0.36 10*3/MM3 (ref 0–1)
MONOCYTES NFR BLD AUTO: 10.7 % (ref 0–12)
NEUTROPHILS # BLD AUTO: 1.57 10*3/MM3 (ref 1.5–8.3)
NEUTROPHILS NFR BLD AUTO: 46.6 % (ref 41–71)
PLATELET # BLD AUTO: 135 10*3/MM3 (ref 150–450)
POTASSIUM SERPL-SCNC: 3.8 MMOL/L (ref 3.5–5.5)
PROT SERPL-MCNC: 7.1 G/DL (ref 5.7–8.2)
RBC # BLD AUTO: 4.97 10*6/MM3 (ref 4.2–5.76)
SODIUM SERPL-SCNC: 139 MMOL/L (ref 132–146)
TRIGL SERPL-MCNC: 38 MG/DL (ref 0–150)
TSH SERPL DL<=0.005 MIU/L-ACNC: 3.95 MIU/ML (ref 0.35–5.35)
VLDLC SERPL CALC-MCNC: 7.6 MG/DL
WBC # BLD AUTO: 3.37 10*3/MM3 (ref 4.5–13.5)

## 2018-06-14 PROCEDURE — 99395 PREV VISIT EST AGE 18-39: CPT | Performed by: PHYSICIAN ASSISTANT

## 2018-06-14 PROCEDURE — 90471 IMMUNIZATION ADMIN: CPT | Performed by: PHYSICIAN ASSISTANT

## 2018-06-14 PROCEDURE — 99213 OFFICE O/P EST LOW 20 MIN: CPT | Performed by: PHYSICIAN ASSISTANT

## 2018-06-14 PROCEDURE — 90715 TDAP VACCINE 7 YRS/> IM: CPT | Performed by: PHYSICIAN ASSISTANT

## 2018-06-14 NOTE — PROGRESS NOTES
"Chief Complaint   Patient presents with   • Annual Exam       Subjective   Newton Franklin is a 22 y.o. male.       History of Present Illness     The patient is being seen for a health maintenance evaluation.  The last health maintenance was 1 year(s) ago.    Social History  Newton  does not smoke cigarettes.   He drinks no alcohol.  He does not use illicit drugs.    General History  Newton  does not have regular dental visits.  He does not complain of vision problems. Last eye exam was unknown.  Immunizations are not up to date. The patient needs the following immunizations: needs tdap, unsure about hpv and meningococcal    Lifestyle  Newton  consumes a in general, a \"healthy\" diet  .  He exercises daily.    Reproductive Health  Newton  is sexually active. His contraceptive plan is condoms.   He does not have erectile dysfunction.     Screening  Last PSA was never.  Last prostate exam was  Never. No family history of prostate cancer  Last testicular exam was 1 year ago. No family history of testicular cancer.  Last colonoscopy was never. No family history of colon cancer.    Pt was climbing was yesterday and hurt his left forearm while he was climbing. Had a difficult hold and was not able to maintain it. Continues to have pain with lifting and flexing his forearm. Also notes some recent pain in his left calf after his run yesterday.    Had a syncopal episode after getting a tattoo a few weeks ago. Recovered quickly, no recurrent symptoms.                       Current Outpatient Prescriptions:   •  fluticasone ([employer] FLUTICASONE PROPIONATE) 50 MCG/ACT nasal spray, 2 sprays into each nostril Daily., Disp: 1 bottle, Rfl: 2  •  loratadine (CLARITIN) 10 MG tablet, Take 1 tablet by mouth Daily., Disp: 30 tablet, Rfl: 1     PMFSH  The following portions of the patient's history were reviewed and updated as appropriate: allergies, current medications, past family history, past medical history, past social history, past surgical " history and problem list.    Review of Systems   Constitutional: Negative for appetite change, fever and unexpected weight change.   HENT: Negative for ear pain, facial swelling and sore throat.    Eyes: Negative for pain and visual disturbance.   Respiratory: Negative for chest tightness, shortness of breath and wheezing.    Cardiovascular: Negative for chest pain and palpitations.   Gastrointestinal: Negative for abdominal pain and blood in stool.   Endocrine: Negative.    Genitourinary: Negative for difficulty urinating and hematuria.   Musculoskeletal: Positive for myalgias. Negative for joint swelling.   Neurological: Positive for syncope. Negative for dizziness, tremors and seizures.   Hematological: Negative for adenopathy.   Psychiatric/Behavioral: Negative.        Objective   /74   Pulse 78   Wt 77.4 kg (170 lb 9.6 oz)   SpO2 98%   BMI 21.90 kg/m²     Physical Exam   Constitutional: He is oriented to person, place, and time. He appears well-developed and well-nourished. No distress.   HENT:   Head: Normocephalic and atraumatic. Hair is normal.   Right Ear: Hearing, tympanic membrane, external ear and ear canal normal.   Left Ear: Hearing, tympanic membrane, external ear and ear canal normal.   Nose: No sinus tenderness or nasal deformity.   Mouth/Throat: Uvula is midline, oropharynx is clear and moist and mucous membranes are normal. No oral lesions. No uvula swelling.   Eyes: Conjunctivae, EOM and lids are normal. Pupils are equal, round, and reactive to light. Right eye exhibits no discharge. Left eye exhibits no discharge. No scleral icterus. Right eye exhibits normal extraocular motion and no nystagmus. Left eye exhibits normal extraocular motion and no nystagmus.   Fundoscopic exam:       The right eye shows red reflex.        The left eye shows red reflex.   Neck: Normal range of motion. Neck supple. No JVD present. No tracheal deviation present. No thyromegaly present.   Cardiovascular:  Normal rate, regular rhythm, normal heart sounds, intact distal pulses and normal pulses.  Exam reveals no gallop.    No murmur heard.  Pulmonary/Chest: Effort normal and breath sounds normal. No respiratory distress. He has no wheezes. He has no rales. He exhibits no tenderness.   Abdominal: Soft. Bowel sounds are normal. He exhibits no distension and no mass. There is no tenderness. There is no guarding. No hernia.   Genitourinary: Rectum normal and prostate normal.   Musculoskeletal: Normal range of motion. He exhibits no edema, tenderness or deformity.        Left forearm: He exhibits no tenderness, no bony tenderness, no swelling, no edema and no deformity.   Lymphadenopathy:     He has no cervical adenopathy.   Neurological: He is alert and oriented to person, place, and time. He has normal reflexes. He displays normal reflexes. No cranial nerve deficit. He exhibits normal muscle tone. Coordination normal.   Skin: Skin is warm and dry. No rash noted. He is not diaphoretic.   Psychiatric: He has a normal mood and affect. His behavior is normal. Judgment and thought content normal.   Nursing note and vitals reviewed.      Results for orders placed or performed in visit on 06/14/18   Comprehensive Metabolic Panel   Result Value Ref Range    Glucose 90 70 - 100 mg/dL    BUN 15 9 - 23 mg/dL    Creatinine 0.86 0.60 - 1.30 mg/dL    eGFR Non African Am 113 >60 mL/min/1.73    eGFR African Am 137 >60 mL/min/1.73    BUN/Creatinine Ratio 17.4 7.0 - 25.0    Sodium 139 132 - 146 mmol/L    Potassium 3.8 3.5 - 5.5 mmol/L    Chloride 106 99 - 109 mmol/L    Total CO2 26.0 20.0 - 31.0 mmol/L    Calcium 9.2 8.7 - 10.4 mg/dL    Total Protein 7.1 5.7 - 8.2 g/dL    Albumin 4.41 3.20 - 4.80 g/dL    Globulin 2.7 gm/dL    A/G Ratio 1.6 1.5 - 2.5 g/dL    Total Bilirubin 0.7 0.3 - 1.2 mg/dL    Alkaline Phosphatase 88 25 - 100 U/L    AST (SGOT) 18 0 - 33 U/L    ALT (SGPT) 13 7 - 40 U/L   Lipid Panel   Result Value Ref Range    Total  Cholesterol 104 0 - 200 mg/dL    Triglycerides 38 0 - 150 mg/dL    HDL Cholesterol 35 (L) 40 - 60 mg/dL    VLDL Cholesterol 7.6 mg/dL    LDL Cholesterol  61 0 - 100 mg/dL   TSH   Result Value Ref Range    TSH 3.947 0.350 - 5.350 mIU/mL   Vitamin D 25 Hydroxy   Result Value Ref Range    25 Hydroxy, Vitamin D 23.5 ng/ml   CBC & Differential   Result Value Ref Range    WBC 3.37 (L) 4.50 - 13.50 10*3/mm3    RBC 4.97 4.20 - 5.76 10*6/mm3    Hemoglobin 15.3 13.1 - 17.5 g/dL    Hematocrit 43.8 38.9 - 50.9 %    MCV 88.1 80.0 - 99.0 fL    MCH 30.8 27.0 - 31.0 pg    MCHC 34.9 32.0 - 36.0 g/dL    RDW 12.8 11.3 - 14.5 %    Platelets 135 (L) 150 - 450 10*3/mm3    Neutrophil Rel % 46.6 41.0 - 71.0 %    Lymphocyte Rel % 35.3 24.0 - 44.0 %    Monocyte Rel % 10.7 0.0 - 12.0 %    Eosinophil Rel % 6.2 (H) 0.0 - 3.0 %    Basophil Rel % 1.2 (H) 0.0 - 1.0 %    Neutrophils Absolute 1.57 1.50 - 8.30 10*3/mm3    Lymphocytes Absolute 1.19 0.60 - 4.80 10*3/mm3    Monocytes Absolute 0.36 0.00 - 1.00 10*3/mm3    Eosinophils Absolute 0.21 0.00 - 0.30 10*3/mm3    Basophils Absolute 0.04 0.00 - 0.20 10*3/mm3    Immature Granulocyte Rel % 0.0 0.0 - 0.6 %    Immature Grans Absolute 0.00 0.00 - 0.03 10*3/mm3        ASSESSMENT/PLAN    Problem List Items Addressed This Visit        Other    Health care maintenance - Primary     Immunizations: TDaP done today; pt will check with his grandmother to see if they have childhood immunization records  Eye exam: recommended  Prostate exam: due age 52  PSA: due in 40s  Colonoscopy: due age 52  Labs: fasting labs ordered         Relevant Orders    CBC & Differential (Completed)    Comprehensive Metabolic Panel (Completed)    Lipid Panel (Completed)    TSH (Completed)    Vitamin D 25 Hydroxy (Completed)      Other Visit Diagnoses     Vasovagal syncope        Likely related to needle with tattoo. Fasting labs ordered.    Strain of left forearm, initial encounter        Rest, gentle ROM, ibuprofen prn.                 Return in about 1 year (around 6/14/2019) for Annual physical.

## 2018-06-15 NOTE — ASSESSMENT & PLAN NOTE
Immunizations: TDaP done today; pt will check with his grandmother to see if they have childhood immunization records  Eye exam: recommended  Prostate exam: due age 52  PSA: due in 40s  Colonoscopy: due age 52  Labs: fasting labs ordered

## 2018-06-19 ENCOUNTER — TELEPHONE (OUTPATIENT)
Dept: INTERNAL MEDICINE | Facility: CLINIC | Age: 23
End: 2018-06-19

## 2018-06-19 DIAGNOSIS — D72.810 LYMPHOCYTOPENIA: Primary | ICD-10-CM

## 2018-06-19 NOTE — TELEPHONE ENCOUNTER
Please let him know that his recent labs showed that his white count and platelet counts were slightly low. I would like him to stop back by the office in 2-3 weeks to recheck these levels- no need to be fasting.    His vitamin D was also slightly low. He can start a daily dose of OTC vitamin D 1,000 units to boost his level.    Everything else looks fine.

## 2018-06-20 ENCOUNTER — RESULTS ENCOUNTER (OUTPATIENT)
Dept: INTERNAL MEDICINE | Facility: CLINIC | Age: 23
End: 2018-06-20

## 2018-06-20 DIAGNOSIS — D72.810 LYMPHOCYTOPENIA: ICD-10-CM

## 2018-06-20 NOTE — TELEPHONE ENCOUNTER
Called and informed pt's grandmother Arline garcia per SWATI of message, she voiced understanding and will relay message to pt.

## 2019-11-26 ENCOUNTER — OFFICE VISIT (OUTPATIENT)
Dept: INTERNAL MEDICINE | Facility: CLINIC | Age: 24
End: 2019-11-26

## 2019-11-26 VITALS
WEIGHT: 180.8 LBS | OXYGEN SATURATION: 99 % | HEART RATE: 52 BPM | BODY MASS INDEX: 23.21 KG/M2 | DIASTOLIC BLOOD PRESSURE: 82 MMHG | SYSTOLIC BLOOD PRESSURE: 110 MMHG

## 2019-11-26 DIAGNOSIS — K06.9 GUM DISEASE: Primary | ICD-10-CM

## 2019-11-26 PROCEDURE — 99213 OFFICE O/P EST LOW 20 MIN: CPT | Performed by: PHYSICIAN ASSISTANT

## 2019-11-26 RX ORDER — AMOXICILLIN AND CLAVULANATE POTASSIUM 875; 125 MG/1; MG/1
1 TABLET, FILM COATED ORAL 2 TIMES DAILY
Qty: 20 TABLET | Refills: 0 | Status: SHIPPED | OUTPATIENT
Start: 2019-11-26

## 2019-11-26 NOTE — PROGRESS NOTES
Chief Complaint   Patient presents with   • Mouth is sore, swollen     Acute       Subjective   Newton Rigoberto is a 24 y.o. male.       History of Present Illness     Pt woke up yesterday morning with blood on his pillow, had bleeding from his gums. Felt like he could not close his mouth evenly. He has a dentist and scheduled an appointment tomorrow. His gums seemed to be swollen, slightly better today. Took ibuprofen twice yesterday and it helped significantly with the pain. Has right swollen lymph node.          Current Outpatient Medications:   •  amoxicillin-clavulanate (AUGMENTIN) 875-125 MG per tablet, Take 1 tablet by mouth 2 (Two) Times a Day., Disp: 20 tablet, Rfl: 0     PMFSH  The following portions of the patient's history were reviewed and updated as appropriate: allergies, current medications, past family history, past medical history, past social history, past surgical history and problem list.    Review of Systems   Constitutional: Negative for activity change, appetite change, fatigue and fever.   HENT: Positive for dental problem. Negative for congestion and rhinorrhea.    Respiratory: Negative for chest tightness and shortness of breath.    Cardiovascular: Negative for chest pain and palpitations.   Gastrointestinal: Negative for abdominal pain.   Genitourinary: Negative for dysuria.   Musculoskeletal: Negative for arthralgias and myalgias.   Neurological: Negative for dizziness, weakness, light-headedness and headaches.   Psychiatric/Behavioral: Negative for dysphoric mood. The patient is not nervous/anxious.        Objective   /82   Pulse 52   Wt 82 kg (180 lb 12.8 oz)   SpO2 99%   BMI 23.21 kg/m²     Physical Exam   Constitutional: He is oriented to person, place, and time. He appears well-developed and well-nourished.   HENT:   Head: Normocephalic and atraumatic.   Mouth/Throat: Oropharynx is clear and moist and mucous membranes are normal. Abnormal dentition (erythematous, edematous  gums; right upper gum line with visible abrasion- no active bleeding). Dental caries present. No dental abscesses.   Eyes: Conjunctivae are normal.   Neck: Normal range of motion.   Cardiovascular: Normal rate and regular rhythm.   Pulmonary/Chest: Effort normal.   Abdominal: Soft.   Musculoskeletal: Normal range of motion.   Lymphadenopathy:        Head (right side): Submandibular and tonsillar adenopathy present.        Head (left side): No submandibular and no tonsillar adenopathy present.     He has no cervical adenopathy.   Neurological: He is alert and oriented to person, place, and time.   Skin: Skin is warm and dry.   Psychiatric: He has a normal mood and affect. His behavior is normal.            ASSESSMENT/PLAN    Problem List Items Addressed This Visit     None      Visit Diagnoses     Gum disease    -  Primary    Treat with augmentin as ordered. Continue prn ibuprofen for pain. Use warm salt water rinses. Keep appt with dentist tomorrow.    Relevant Medications    amoxicillin-clavulanate (AUGMENTIN) 875-125 MG per tablet               Return if symptoms worsen or fail to improve.